# Patient Record
Sex: FEMALE | Race: WHITE | URBAN - METROPOLITAN AREA
[De-identification: names, ages, dates, MRNs, and addresses within clinical notes are randomized per-mention and may not be internally consistent; named-entity substitution may affect disease eponyms.]

---

## 2021-02-25 ENCOUNTER — OFFICE VISIT (OUTPATIENT)
Dept: URBAN - METROPOLITAN AREA CLINIC 54 | Facility: CLINIC | Age: 86
End: 2021-02-25
Payer: MEDICARE

## 2021-02-25 DIAGNOSIS — H35.051 RETINAL NEOVASCULARIZATION, UNSPECIFIED, RIGHT EYE: Primary | ICD-10-CM

## 2021-02-25 PROCEDURE — 92134 CPTRZ OPH DX IMG PST SGM RTA: CPT | Performed by: OPHTHALMOLOGY

## 2021-02-25 PROCEDURE — 99214 OFFICE O/P EST MOD 30 MIN: CPT | Performed by: OPHTHALMOLOGY

## 2021-02-25 ASSESSMENT — INTRAOCULAR PRESSURE
OD: 14
OS: 13

## 2021-02-25 NOTE — IMPRESSION/PLAN
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 Status: Chronic. OCT 2/25/21 OD PP Atrophy no fluid OS WNL

FA 9/14/17 OD staining of atrophy no leakage OS WNL Plan: No interval worsening. Stable exam and vision. Rec observation.  

RTC 12 months for exam, OCT OU

## 2022-02-24 ENCOUNTER — OFFICE VISIT (OUTPATIENT)
Dept: URBAN - METROPOLITAN AREA CLINIC 54 | Facility: CLINIC | Age: 87
End: 2022-02-24
Payer: MEDICARE

## 2022-02-24 PROCEDURE — 99214 OFFICE O/P EST MOD 30 MIN: CPT | Performed by: OPHTHALMOLOGY

## 2022-02-24 PROCEDURE — 92134 CPTRZ OPH DX IMG PST SGM RTA: CPT | Performed by: OPHTHALMOLOGY

## 2022-02-24 ASSESSMENT — INTRAOCULAR PRESSURE
OS: 12
OD: 13

## 2022-02-24 NOTE — IMPRESSION/PLAN
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 Status: Chronic. OCT 02/24/22 OD PP Atrophy no fluid OS WNL

FA 9/14/17 OD staining of atrophy no leakage OS WNL Plan: No interval worsening. Stable exam and vision. Rec observation.  

RTC 12 months for exam, OCT OU

## 2023-03-23 ENCOUNTER — OFFICE VISIT (OUTPATIENT)
Dept: URBAN - METROPOLITAN AREA CLINIC 54 | Facility: CLINIC | Age: 88
End: 2023-03-23
Payer: MEDICARE

## 2023-03-23 DIAGNOSIS — H35.051 RETINAL NEOVASCULARIZATION, UNSPECIFIED, RIGHT EYE: Primary | ICD-10-CM

## 2023-03-23 PROCEDURE — 92134 CPTRZ OPH DX IMG PST SGM RTA: CPT | Performed by: OPHTHALMOLOGY

## 2023-03-23 PROCEDURE — 99214 OFFICE O/P EST MOD 30 MIN: CPT | Performed by: OPHTHALMOLOGY

## 2023-03-23 ASSESSMENT — INTRAOCULAR PRESSURE
OS: 11
OD: 12

## 2023-03-23 NOTE — IMPRESSION/PLAN
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 Status: Chronic. OCT 3/23/2023 OD PP Atrophy no fluid OS WNL

FA 9/14/17 OD staining of atrophy no leakage OS WNL Plan: No interval worsening. Stable exam and vision. Rec observation.  

RTC 12 months for exam, OCT OU

## 2024-04-25 ENCOUNTER — OFFICE VISIT (OUTPATIENT)
Dept: URBAN - METROPOLITAN AREA CLINIC 54 | Facility: CLINIC | Age: 89
End: 2024-04-25
Payer: MEDICARE

## 2024-04-25 DIAGNOSIS — H35.051 RETINAL NEOVASCULARIZATION, UNSPECIFIED, RIGHT EYE: Primary | ICD-10-CM

## 2024-04-25 PROCEDURE — 92134 CPTRZ OPH DX IMG PST SGM RTA: CPT | Performed by: OPHTHALMOLOGY

## 2024-04-25 PROCEDURE — 99213 OFFICE O/P EST LOW 20 MIN: CPT | Performed by: OPHTHALMOLOGY

## 2024-04-25 ASSESSMENT — INTRAOCULAR PRESSURE
OS: 10
OD: 12